# Patient Record
Sex: MALE | NOT HISPANIC OR LATINO | Employment: FULL TIME | URBAN - METROPOLITAN AREA
[De-identification: names, ages, dates, MRNs, and addresses within clinical notes are randomized per-mention and may not be internally consistent; named-entity substitution may affect disease eponyms.]

---

## 2019-08-13 ENCOUNTER — OFFICE VISIT (OUTPATIENT)
Dept: MEDICAL GROUP | Facility: PHYSICIAN GROUP | Age: 29
End: 2019-08-13
Payer: COMMERCIAL

## 2019-08-13 VITALS
BODY MASS INDEX: 17.64 KG/M2 | OXYGEN SATURATION: 99 % | HEIGHT: 71 IN | HEART RATE: 70 BPM | TEMPERATURE: 98.1 F | DIASTOLIC BLOOD PRESSURE: 56 MMHG | RESPIRATION RATE: 16 BRPM | SYSTOLIC BLOOD PRESSURE: 94 MMHG | WEIGHT: 126 LBS

## 2019-08-13 DIAGNOSIS — F17.210 CIGARETTE NICOTINE DEPENDENCE WITHOUT COMPLICATION: ICD-10-CM

## 2019-08-13 DIAGNOSIS — Z86.39 HISTORY OF HYPERLIPIDEMIA: ICD-10-CM

## 2019-08-13 DIAGNOSIS — Z00.00 ROUTINE ADULT HEALTH MAINTENANCE: ICD-10-CM

## 2019-08-13 PROBLEM — F17.211 CIGARETTE NICOTINE DEPENDENCE IN REMISSION: Status: ACTIVE | Noted: 2019-08-13

## 2019-08-13 PROCEDURE — 99385 PREV VISIT NEW AGE 18-39: CPT | Performed by: FAMILY MEDICINE

## 2019-08-13 SDOH — HEALTH STABILITY: MENTAL HEALTH: HOW MANY STANDARD DRINKS CONTAINING ALCOHOL DO YOU HAVE ON A TYPICAL DAY?: 1 OR 2

## 2019-08-13 SDOH — HEALTH STABILITY: MENTAL HEALTH: HOW OFTEN DO YOU HAVE A DRINK CONTAINING ALCOHOL?: MONTHLY OR LESS

## 2019-08-13 SDOH — HEALTH STABILITY: MENTAL HEALTH: HOW OFTEN DO YOU HAVE 6 OR MORE DRINKS ON ONE OCCASION?: NEVER

## 2019-08-13 ASSESSMENT — PATIENT HEALTH QUESTIONNAIRE - PHQ9: CLINICAL INTERPRETATION OF PHQ2 SCORE: 0

## 2019-08-13 NOTE — PROGRESS NOTES
"cc: History of high cholesterol    Subjective:     Devon Tena is a 29 y.o. male presenting to \[facility]\"" care.  He mentions that he was previously seen at [facility] clinics and has had his previous set of labs done through [facility].  Mentions that he believes he received the Tdap vaccine when his wife was pregnant with twins last year.    1. History of hyperlipidemia  He mentions that he had labs done last year and was told that his cholesterol levels were borderline high.  Was asked to start fish oil.  He had been taking that for some time but discontinued it approximately 8 months ago.  Mentions that at that time his twins were born and he is just been busy is a new father.  Denies any chest pain or headaches.    2. Cigarette nicotine dependence without complication  Chronic medical diagnosis.  Has been smoking approximately 3 cigarettes a day since the age of 14.  Denies any coughing, shortness of breath or wheezing.      Review of systems:  See above and negative for fever chills, abdominal pain, dysuria, visual complaints, hearing difficulties, changes in bowel movements.  No Known Allergies    No current outpatient medications on file.    Allergies, past medical history, past surgical history, family history, social history reviewed and updated    Objective:     Vitals: BP (!) 94/56 (BP Location: Left arm, Patient Position: Sitting, BP Cuff Size: Small adult)   Pulse 70   Temp 36.7 °C (98.1 °F) (Temporal)   Resp 16   Ht 1.803 m (5' 11\")   Wt 57.2 kg (126 lb)   SpO2 99%   BMI 17.57 kg/m²   General:  Alert, pleasant, NAD  Eyes:  normal inspection of conjunctivae and lids, EOMI,   ENMT:  External ears and nose are normal.    Neck  supple,   Heart:  Regular rate and rhythm,  No LE edema  Respiratory:  Normal respiratory effort, Clear to auscultation bilaterally.  Abdomen:   soft, Non-distended,   Skin:  Warm, dry, no rashes,   Musculoskeletal:  Normal gait, Normal digits and nails.  Neurological: No tremors, "   Psych:   Affect/mood is normal, judgement is good, memory is intact, grooming is appropriate.    Assessment/Plan:     Devon was seen today for establish care.    Diagnoses and all orders for this visit:    History of hyperlipidemia  History of.  Will check labs.    Cigarette nicotine dependence without complication  Chronic diagnosis.  Smoking cessation discussed with patient.    Routine adult health maintenance  -     CBC WITH DIFFERENTIAL; Future  -     Comp Metabolic Panel; Future  -     Lipid Profile; Future          Return in about 1 year (around 8/13/2020).

## 2019-08-13 NOTE — LETTER
[facility]  Merna Mccarthy M.D.  [address]  [place] [address]  Fax: [phone]   Authorization for Release/Disclosure of   Protected Health Information   Name: DEVON BASS : 1990 SSN: [id number]   Address: [facility] [address] [place] [address] Phone:    [phone] (home)    I authorize the entity listed below to release/disclose the PHI below to:   [facility]/Merna Mccarthy M.D. and Merna Mccarthy M.D.   Provider or Entity Name:  [facility]   Address   City, State, Zip   Phone:      Fax:     Reason for request: continuity of care   Information to be released:    [  ] LAST COLONOSCOPY,  including any PATH REPORT and follow-up  [  ] LAST FIT/COLOGUARD RESULT [  ] LAST DEXA  [  ] LAST MAMMOGRAM  [  ] LAST PAP  [  y] LAST LABS [  ] RETINA EXAM REPORT  [  ] IMMUNIZATION RECORDS  [  ] Release all info      [  ] Check here and initial the line next to each item to release ALL health information INCLUDING  _____ Care and treatment for drug and / or alcohol abuse  _____ HIV testing, infection status, or AIDS  _____ Genetic Testing    DATES OF SERVICE OR TIME PERIOD TO BE DISCLOSED: _____________  I understand and acknowledge that:  * This Authorization may be revoked at any time by you in writing, except if your health information has already been used or disclosed.  * Your health information that will be used or disclosed as a result of you signing this authorization could be re-disclosed by the recipient. If this occurs, your re-disclosed health information may no longer be protected by State or Federal laws.  * You may refuse to sign this Authorization. Your refusal will not affect your ability to obtain treatment.  * This Authorization becomes effective upon signing and will  on (date) __________.      If no date is indicated, this Authorization will  one (1) year from the signature date.    Name: Devon Bass    Signature:   Date:     2019       PLEASE FAX REQUESTED RECORDS BACK TO:  [phone]

## 2020-11-23 ENCOUNTER — OFFICE VISIT (OUTPATIENT)
Dept: MEDICAL GROUP | Facility: PHYSICIAN GROUP | Age: 30
End: 2020-11-23
Payer: COMMERCIAL

## 2020-11-23 VITALS
HEIGHT: 70 IN | WEIGHT: 131 LBS | HEART RATE: 81 BPM | RESPIRATION RATE: 14 BRPM | SYSTOLIC BLOOD PRESSURE: 100 MMHG | TEMPERATURE: 98.4 F | OXYGEN SATURATION: 98 % | DIASTOLIC BLOOD PRESSURE: 68 MMHG | BODY MASS INDEX: 18.75 KG/M2

## 2020-11-23 DIAGNOSIS — Z86.39 HISTORY OF HYPERLIPIDEMIA: ICD-10-CM

## 2020-11-23 DIAGNOSIS — Z00.00 ROUTINE ADULT HEALTH MAINTENANCE: ICD-10-CM

## 2020-11-23 DIAGNOSIS — F17.210 CIGARETTE NICOTINE DEPENDENCE WITHOUT COMPLICATION: ICD-10-CM

## 2020-11-23 DIAGNOSIS — Z23 NEED FOR VACCINATION: ICD-10-CM

## 2020-11-23 PROCEDURE — 99395 PREV VISIT EST AGE 18-39: CPT | Mod: 25 | Performed by: FAMILY MEDICINE

## 2020-11-23 PROCEDURE — 90732 PPSV23 VACC 2 YRS+ SUBQ/IM: CPT | Performed by: FAMILY MEDICINE

## 2020-11-23 PROCEDURE — 90471 IMMUNIZATION ADMIN: CPT | Performed by: FAMILY MEDICINE

## 2020-11-23 ASSESSMENT — PATIENT HEALTH QUESTIONNAIRE - PHQ9: CLINICAL INTERPRETATION OF PHQ2 SCORE: 0

## 2020-11-24 ENCOUNTER — HOSPITAL ENCOUNTER (OUTPATIENT)
Dept: LAB | Facility: MEDICAL CENTER | Age: 30
End: 2020-11-24
Attending: FAMILY MEDICINE
Payer: COMMERCIAL

## 2020-11-24 DIAGNOSIS — Z00.00 ROUTINE ADULT HEALTH MAINTENANCE: ICD-10-CM

## 2020-11-24 LAB
ALBUMIN SERPL BCP-MCNC: 4.8 G/DL (ref 3.2–4.9)
ALBUMIN/GLOB SERPL: 1.7 G/DL
ALP SERPL-CCNC: 71 U/L (ref 30–99)
ALT SERPL-CCNC: 14 U/L (ref 2–50)
ANION GAP SERPL CALC-SCNC: 7 MMOL/L (ref 7–16)
AST SERPL-CCNC: 17 U/L (ref 12–45)
BASOPHILS # BLD AUTO: 0.6 % (ref 0–1.8)
BASOPHILS # BLD: 0.04 K/UL (ref 0–0.12)
BILIRUB SERPL-MCNC: 0.6 MG/DL (ref 0.1–1.5)
BUN SERPL-MCNC: 6 MG/DL (ref 8–22)
CALCIUM SERPL-MCNC: 9.8 MG/DL (ref 8.5–10.5)
CHLORIDE SERPL-SCNC: 101 MMOL/L (ref 96–112)
CHOLEST SERPL-MCNC: 182 MG/DL (ref 100–199)
CO2 SERPL-SCNC: 29 MMOL/L (ref 20–33)
CREAT SERPL-MCNC: 0.97 MG/DL (ref 0.5–1.4)
EOSINOPHIL # BLD AUTO: 0.24 K/UL (ref 0–0.51)
EOSINOPHIL NFR BLD: 3.5 % (ref 0–6.9)
ERYTHROCYTE [DISTWIDTH] IN BLOOD BY AUTOMATED COUNT: 47 FL (ref 35.9–50)
FASTING STATUS PATIENT QL REPORTED: NORMAL
GLOBULIN SER CALC-MCNC: 2.8 G/DL (ref 1.9–3.5)
GLUCOSE SERPL-MCNC: 84 MG/DL (ref 65–99)
HCT VFR BLD AUTO: 45 % (ref 42–52)
HDLC SERPL-MCNC: 56 MG/DL
HGB BLD-MCNC: 15.1 G/DL (ref 14–18)
IMM GRANULOCYTES # BLD AUTO: 0.01 K/UL (ref 0–0.11)
IMM GRANULOCYTES NFR BLD AUTO: 0.1 % (ref 0–0.9)
LDLC SERPL CALC-MCNC: 110 MG/DL
LYMPHOCYTES # BLD AUTO: 1.96 K/UL (ref 1–4.8)
LYMPHOCYTES NFR BLD: 28.6 % (ref 22–41)
MCH RBC QN AUTO: 31.6 PG (ref 27–33)
MCHC RBC AUTO-ENTMCNC: 33.6 G/DL (ref 33.7–35.3)
MCV RBC AUTO: 94.1 FL (ref 81.4–97.8)
MONOCYTES # BLD AUTO: 0.63 K/UL (ref 0–0.85)
MONOCYTES NFR BLD AUTO: 9.2 % (ref 0–13.4)
NEUTROPHILS # BLD AUTO: 3.98 K/UL (ref 1.82–7.42)
NEUTROPHILS NFR BLD: 58 % (ref 44–72)
NRBC # BLD AUTO: 0 K/UL
NRBC BLD-RTO: 0 /100 WBC
PLATELET # BLD AUTO: 216 K/UL (ref 164–446)
PMV BLD AUTO: 11.3 FL (ref 9–12.9)
POTASSIUM SERPL-SCNC: 4 MMOL/L (ref 3.6–5.5)
PROT SERPL-MCNC: 7.6 G/DL (ref 6–8.2)
RBC # BLD AUTO: 4.78 M/UL (ref 4.7–6.1)
SODIUM SERPL-SCNC: 137 MMOL/L (ref 135–145)
TRIGL SERPL-MCNC: 81 MG/DL (ref 0–149)
WBC # BLD AUTO: 6.9 K/UL (ref 4.8–10.8)

## 2020-11-24 PROCEDURE — 80053 COMPREHEN METABOLIC PANEL: CPT

## 2020-11-24 PROCEDURE — 85025 COMPLETE CBC W/AUTO DIFF WBC: CPT

## 2020-11-24 PROCEDURE — 36415 COLL VENOUS BLD VENIPUNCTURE: CPT

## 2020-11-24 PROCEDURE — 80061 LIPID PANEL: CPT

## 2020-11-24 NOTE — PROGRESS NOTES
"CC: annual exam                                                                                                                                    Devon presents today for an annual exam    Vaccines discussed with patient and patient will receive pneumococcal vaccine in the office today, has already received his flu shot.     Routine adult health maintenance  Patient here for annual exam. Reports no new hospitalizations, surgeries, or medical problems since last visit.  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 He is smoking about 5 cigarettes daily, has increased since he has been working form home. He is not interested in quitting at this time. Denies fevers, chills, shortness of breath, headaches or chest pain. Not currently exercising.     History of hyperlipidemia   No new lab work since last visit. New lab work ordered today.     Patient Active Problem List    Diagnosis Date Noted   • History of hyperlipidemia 08/13/2019   • Cigarette nicotine dependence without complication 08/13/2019       No current outpatient medications on file.     No current facility-administered medications for this visit.          Allergies as of 11/23/2020   • (No Known Allergies)          /68 (BP Location: Left arm, Patient Position: Sitting, BP Cuff Size: Adult)   Pulse 81   Temp 36.9 °C (98.4 °F) (Temporal)   Resp 14   Ht 1.803 m (5' 11\")   Wt 59.4 kg (131 lb)   SpO2 98%   BMI 18.27 kg/m²     Physical Exam:  Gen:         Alert and oriented, No apparent distress.  Neck:        supple, No Lymphadenopathy  Lungs:     Clear to auscultation bilaterally  CV:          Regular rate and rhythm. no LE edema            Ext:          No clubbing, cyanosis      Assessment and Plan.   30 y.o. male with the following issues.    Devon was seen today for annual exam.    Diagnoses and all orders for this visit:    Routine adult health maintenance  Patient " Counseling:  --Discussed dental visits, states he is waiting to see the dentist since covid started   --Encouraged regular exercise.   --Discussed tobacco, alcohol, or other drug use; availability of treatment for abuse. Patient declined  --Injury prevention: Discussed safety belts, safety helmets, smoke detector, etc.    Cigarette nicotine dependence  Chronic. Not improving. Patient has been smoking more while working from home. Smoking cessation education addressed and patient declined.     History of hyperlipidemia  Chronic. Patient reported.  Lab work ordered to be completed for next visit    Need for vaccination  Pneumococcal vaccine given in office today    Follow up: 1 year

## 2022-12-06 ENCOUNTER — OFFICE VISIT (OUTPATIENT)
Dept: INTERNAL MEDICINE | Facility: OTHER | Age: 32
End: 2022-12-06
Payer: COMMERCIAL

## 2022-12-06 VITALS
DIASTOLIC BLOOD PRESSURE: 72 MMHG | OXYGEN SATURATION: 96 % | HEIGHT: 71 IN | SYSTOLIC BLOOD PRESSURE: 119 MMHG | WEIGHT: 138.2 LBS | TEMPERATURE: 98.7 F | HEART RATE: 80 BPM | BODY MASS INDEX: 19.35 KG/M2

## 2022-12-06 DIAGNOSIS — Z00.00 ENCOUNTER FOR ROUTINE ADULT HEALTH EXAMINATION WITHOUT ABNORMAL FINDINGS: ICD-10-CM

## 2022-12-06 DIAGNOSIS — Z71.6 ENCOUNTER FOR TOBACCO USE CESSATION COUNSELING: ICD-10-CM

## 2022-12-06 DIAGNOSIS — Z00.00 ROUTINE ADULT HEALTH MAINTENANCE: ICD-10-CM

## 2022-12-06 DIAGNOSIS — Z86.39 HISTORY OF HYPERLIPIDEMIA: ICD-10-CM

## 2022-12-06 PROCEDURE — 90471 IMMUNIZATION ADMIN: CPT | Performed by: STUDENT IN AN ORGANIZED HEALTH CARE EDUCATION/TRAINING PROGRAM

## 2022-12-06 PROCEDURE — 99203 OFFICE O/P NEW LOW 30 MIN: CPT | Mod: 25,GC | Performed by: STUDENT IN AN ORGANIZED HEALTH CARE EDUCATION/TRAINING PROGRAM

## 2022-12-06 PROCEDURE — 90686 IIV4 VACC NO PRSV 0.5 ML IM: CPT | Performed by: STUDENT IN AN ORGANIZED HEALTH CARE EDUCATION/TRAINING PROGRAM

## 2022-12-06 ASSESSMENT — ENCOUNTER SYMPTOMS
MYALGIAS: 0
COUGH: 0
NECK PAIN: 0
LOSS OF CONSCIOUSNESS: 0
CONSTIPATION: 0
BLURRED VISION: 0
ABDOMINAL PAIN: 0
HEMOPTYSIS: 0
NAUSEA: 0
SORE THROAT: 0
DIARRHEA: 0
HEADACHES: 0
SINUS PAIN: 0
SHORTNESS OF BREATH: 0
SPUTUM PRODUCTION: 0
CHILLS: 0
SEIZURES: 0
DEPRESSION: 0
HEARTBURN: 0
WEIGHT LOSS: 0
BACK PAIN: 0
ORTHOPNEA: 0
FEVER: 0
VOMITING: 0
DOUBLE VISION: 0
DIZZINESS: 0
PALPITATIONS: 0

## 2022-12-06 ASSESSMENT — PATIENT HEALTH QUESTIONNAIRE - PHQ9: CLINICAL INTERPRETATION OF PHQ2 SCORE: 0

## 2022-12-06 NOTE — ASSESSMENT & PLAN NOTE
-Most recent labs 11/2020: Cholesterol 182, TG 81, HDL 5,   -Discussed at length the benefits to optimizing diet and exercise regimen  -Ordered outpatient Lipid Profile, Hemoglobin A1c, and NMR Lipoprofile

## 2022-12-06 NOTE — PATIENT INSTRUCTIONS
-Please follow up with your outpatient lab work  -Please incorporate diet and exercise lifestyle modifications into your routine

## 2022-12-06 NOTE — PROGRESS NOTES
New Patient    Chief Complaint   Patient presents with    New Patient     Establish care, no acute issues.       HISTORY OF PRESENT ILLNESS:     Mr. Devon Tena is our 32 year old male patient with a past medical history of hyperlipidemia ( in 2020), and current cigarette smoker who presents to our outpatient clinic to establish care. He has no active chief complaints at this time.    He works as a , and lives at home with his wife and two 3 year old twin children. Not currently taking any medications consistently. Denies any allergies or past surgical history. Family history significant for type 2 diabetes mellitus on mother's side, and hypertension on father's side. Smokes 4-6 cigarettes per day; drinks alcohol sparingly socially; denies any illicit drug use. Covid vaccinated x2 without boosters. Diet is vegetarian, but high in greasy foods and carbohydrates. Does not exercise regularly.    Administering Influenza Vaccination at today's visit. Ordering outpatient labwork, as patient's most recent labs are from November 2020. Discussed at length the benefits to optimizing diet and exercise regimen. Patient wishes to schedule next appointment for next year, and discuss labs by phone if possible.    Patient Active Problem List    Diagnosis Date Noted    Encounter for tobacco use cessation counseling 12/06/2022    Encounter for routine adult health examination 12/06/2022    History of hyperlipidemia 08/13/2019    Cigarette nicotine dependence without complication 08/13/2019     Allergies: Patient has no known allergies.    No current outpatient medications on file.     No current facility-administered medications for this visit.       Social History     Tobacco Use    Smoking status: Every Day     Packs/day: 0.75     Years: 15.00     Pack years: 11.25     Types: Cigarettes    Smokeless tobacco: Never    Tobacco comments:     4-6 Cigarettes per day    Vaping Use    Vaping Use: Never used  "  Substance Use Topics    Alcohol use: Yes     Comment: 1-2 per month- occasionally    Drug use: Not Currently     Comment: none       Family History   Problem Relation Age of Onset    Diabetes Mother     Hypertension Mother     Hyperlipidemia Father     No Known Problems Brother        Review of Systems   Constitutional:  Negative for chills, fever, malaise/fatigue and weight loss.   HENT:  Negative for congestion, sinus pain and sore throat.    Eyes:  Negative for blurred vision and double vision.   Respiratory:  Negative for cough, hemoptysis, sputum production and shortness of breath.    Cardiovascular:  Negative for chest pain, palpitations and orthopnea.   Gastrointestinal:  Negative for abdominal pain, constipation, diarrhea, heartburn, nausea and vomiting.   Genitourinary:  Negative for dysuria, frequency and urgency.   Musculoskeletal:  Negative for back pain, joint pain, myalgias and neck pain.   Skin:  Negative for itching and rash.   Neurological:  Negative for dizziness, seizures, loss of consciousness and headaches.   Psychiatric/Behavioral:  Negative for depression.      Exam:  /72 (BP Location: Left arm, Patient Position: Sitting, BP Cuff Size: Adult)   Pulse 80   Temp 37.1 °C (98.7 °F) (Temporal)   Ht 1.803 m (5' 11\")   Wt 62.7 kg (138 lb 3.2 oz)   SpO2 96%  Body mass index is 19.27 kg/m².    Constitutional:  Not in acute distress, well appearing.  HEENT:   Normocephalic, atraumatic.  Cardiovascular: S1, S2; Regular rate and rhythm; No murmurs/rubs/gallops.  Lungs:   Clear to auscultation bilaterally; No wheezes/rhales/rhonchi; No respiratory distress.  Abdomen: Soft, nondistended, not tender to palpation; no guarding no rigidity; no masses.  Extremities:  No cyanosis/clubbing/edema; No obvious deformities.  Skin:  Warm and dry.  No visible rashes.  Neurologic: Alert Awake & Oriented x 3, CN II-XII grossly intact; strength and sensation grossly intact.  No focal deficits " noted.  Psychiatric:  Affect normal, mood normal, judgment normal.    Assessment/Plan:   Mr. Devon Tena is our 32 year old male patient with a past medical history of hyperlipidemia ( in 2020), and current cigarette smoker who presents to our outpatient clinic to establish care.     History of hyperlipidemia  -Most recent labs 11/2020: Cholesterol 182, TG 81, HDL 5,   -Discussed at length the benefits to optimizing diet and exercise regimen  -Ordered outpatient Lipid Profile, Hemoglobin A1c, and NMR Lipoprofile    Encounter for tobacco use cessation counseling  -Patient regularly smoking 4-6 cigarettes daily  -Discussed at length with patient the risks associated with cigarette smoking, especially in light of his family history on mother and father's side and his elevated LDL and more sedentary lifestyle  -Patient not interested in assistance for tobacco cessation at this particular time    Encounter for routine adult health examination  -Patient sent for CBC, CMP, Lipid Profile, Hemoglobin A1c, and NMR Lipoprofile    All imaging results and lab results and consult notes are reviewed at this visit.  Followup: Return in about 1 year (around 12/6/2023).    Clarence Mccray MD  PGY-2 Internal Medicine Resident

## 2022-12-06 NOTE — ASSESSMENT & PLAN NOTE
-Patient regularly smoking 4-6 cigarettes daily  -Discussed at length with patient the risks associated with cigarette smoking, especially in light of his family history on mother and father's side and his elevated LDL and more sedentary lifestyle  -Patient not interested in assistance for tobacco cessation at this particular time

## 2023-11-06 ENCOUNTER — HOSPITAL ENCOUNTER (OUTPATIENT)
Dept: LAB | Facility: MEDICAL CENTER | Age: 33
End: 2023-11-06
Attending: STUDENT IN AN ORGANIZED HEALTH CARE EDUCATION/TRAINING PROGRAM
Payer: COMMERCIAL

## 2023-11-06 DIAGNOSIS — Z00.00 ROUTINE ADULT HEALTH MAINTENANCE: ICD-10-CM

## 2023-11-06 LAB
ALBUMIN SERPL BCP-MCNC: 4.6 G/DL (ref 3.2–4.9)
ALBUMIN/GLOB SERPL: 1.8 G/DL
ALP SERPL-CCNC: 68 U/L (ref 30–99)
ALT SERPL-CCNC: 12 U/L (ref 2–50)
ANION GAP SERPL CALC-SCNC: 9 MMOL/L (ref 7–16)
AST SERPL-CCNC: 22 U/L (ref 12–45)
BASOPHILS # BLD AUTO: 0.4 % (ref 0–1.8)
BASOPHILS # BLD: 0.02 K/UL (ref 0–0.12)
BILIRUB SERPL-MCNC: 0.6 MG/DL (ref 0.1–1.5)
BUN SERPL-MCNC: 7 MG/DL (ref 8–22)
CALCIUM ALBUM COR SERPL-MCNC: 8.6 MG/DL (ref 8.5–10.5)
CALCIUM SERPL-MCNC: 9.1 MG/DL (ref 8.4–10.2)
CHLORIDE SERPL-SCNC: 103 MMOL/L (ref 96–112)
CHOLEST SERPL-MCNC: 161 MG/DL (ref 100–199)
CO2 SERPL-SCNC: 26 MMOL/L (ref 20–33)
CREAT SERPL-MCNC: 0.9 MG/DL (ref 0.5–1.4)
EOSINOPHIL # BLD AUTO: 0.16 K/UL (ref 0–0.51)
EOSINOPHIL NFR BLD: 3.5 % (ref 0–6.9)
ERYTHROCYTE [DISTWIDTH] IN BLOOD BY AUTOMATED COUNT: 46.5 FL (ref 35.9–50)
EST. AVERAGE GLUCOSE BLD GHB EST-MCNC: 114 MG/DL
FASTING STATUS PATIENT QL REPORTED: NORMAL
GFR SERPLBLD CREATININE-BSD FMLA CKD-EPI: 114 ML/MIN/1.73 M 2
GLOBULIN SER CALC-MCNC: 2.6 G/DL (ref 1.9–3.5)
GLUCOSE SERPL-MCNC: 91 MG/DL (ref 65–99)
HBA1C MFR BLD: 5.6 % (ref 4–5.6)
HCT VFR BLD AUTO: 44.7 % (ref 42–52)
HDLC SERPL-MCNC: 51 MG/DL
HGB BLD-MCNC: 15 G/DL (ref 14–18)
IMM GRANULOCYTES # BLD AUTO: 0.01 K/UL (ref 0–0.11)
IMM GRANULOCYTES NFR BLD AUTO: 0.2 % (ref 0–0.9)
LDLC SERPL CALC-MCNC: 92 MG/DL
LYMPHOCYTES # BLD AUTO: 1.92 K/UL (ref 1–4.8)
LYMPHOCYTES NFR BLD: 42.4 % (ref 22–41)
MCH RBC QN AUTO: 31.3 PG (ref 27–33)
MCHC RBC AUTO-ENTMCNC: 33.6 G/DL (ref 32.3–36.5)
MCV RBC AUTO: 93.3 FL (ref 81.4–97.8)
MONOCYTES # BLD AUTO: 0.44 K/UL (ref 0–0.85)
MONOCYTES NFR BLD AUTO: 9.7 % (ref 0–13.4)
NEUTROPHILS # BLD AUTO: 1.98 K/UL (ref 1.82–7.42)
NEUTROPHILS NFR BLD: 43.8 % (ref 44–72)
NRBC # BLD AUTO: 0 K/UL
NRBC BLD-RTO: 0 /100 WBC (ref 0–0.2)
PLATELET # BLD AUTO: 254 K/UL (ref 164–446)
PMV BLD AUTO: 10.4 FL (ref 9–12.9)
POTASSIUM SERPL-SCNC: 4.3 MMOL/L (ref 3.6–5.5)
PROT SERPL-MCNC: 7.2 G/DL (ref 6–8.2)
RBC # BLD AUTO: 4.79 M/UL (ref 4.7–6.1)
SODIUM SERPL-SCNC: 138 MMOL/L (ref 135–145)
TRIGL SERPL-MCNC: 91 MG/DL (ref 0–149)
WBC # BLD AUTO: 4.5 K/UL (ref 4.8–10.8)

## 2023-11-06 PROCEDURE — 80053 COMPREHEN METABOLIC PANEL: CPT

## 2023-11-06 PROCEDURE — 83036 HEMOGLOBIN GLYCOSYLATED A1C: CPT

## 2023-11-06 PROCEDURE — 83704 LIPOPROTEIN BLD QUAN PART: CPT

## 2023-11-06 PROCEDURE — 85025 COMPLETE CBC W/AUTO DIFF WBC: CPT

## 2023-11-06 PROCEDURE — 36415 COLL VENOUS BLD VENIPUNCTURE: CPT

## 2023-11-06 PROCEDURE — 80061 LIPID PANEL: CPT

## 2023-11-10 LAB
CHOLEST SERPL-MCNC: 173 MG/DL
HDL PARTICAL NO Q4363: 33.7 UMOL/L
HDL SIZE Q4361: 8.8 NM
HDLC SERPL-MCNC: 51 MG/DL (ref 40–59)
HLD.LARGE SERPL-SCNC: 5.1 UMOL/L
L VLDL PART NO Q4357: <1.5 NMOL/L
LDL SERPL QN: 21.3 NM
LDL SERPL-SCNC: 1015 NMOL/L
LDL SMALL SERPL-SCNC: 266 NMOL/L
LDLC SERPL CALC-MCNC: 103 MG/DL
PATHOLOGY STUDY: ABNORMAL
TRIGL SERPL-MCNC: 93 MG/DL (ref 30–149)
VLDL SIZE Q4362: 44 NM

## 2023-11-29 ENCOUNTER — OFFICE VISIT (OUTPATIENT)
Dept: INTERNAL MEDICINE | Facility: OTHER | Age: 33
End: 2023-11-29
Payer: COMMERCIAL

## 2023-11-29 VITALS
HEART RATE: 83 BPM | TEMPERATURE: 99 F | DIASTOLIC BLOOD PRESSURE: 71 MMHG | OXYGEN SATURATION: 99 % | HEIGHT: 70 IN | SYSTOLIC BLOOD PRESSURE: 114 MMHG | WEIGHT: 139.6 LBS | BODY MASS INDEX: 19.98 KG/M2

## 2023-11-29 DIAGNOSIS — Z11.3 SCREEN FOR SEXUALLY TRANSMITTED DISEASES: ICD-10-CM

## 2023-11-29 DIAGNOSIS — D70.9 NEUTROPENIA, UNSPECIFIED TYPE (HCC): ICD-10-CM

## 2023-11-29 DIAGNOSIS — Z23 NEEDS FLU SHOT: ICD-10-CM

## 2023-11-29 DIAGNOSIS — Z00.00 ENCOUNTER FOR ROUTINE ADULT HEALTH EXAMINATION WITHOUT ABNORMAL FINDINGS: ICD-10-CM

## 2023-11-29 DIAGNOSIS — Z71.6 ENCOUNTER FOR TOBACCO USE CESSATION COUNSELING: ICD-10-CM

## 2023-11-29 DIAGNOSIS — Z23 NEED FOR VACCINATION: ICD-10-CM

## 2023-11-29 DIAGNOSIS — Z86.39 HISTORY OF HYPERLIPIDEMIA: ICD-10-CM

## 2023-11-29 DIAGNOSIS — Z11.59 NEED FOR HEPATITIS C SCREENING TEST: ICD-10-CM

## 2023-11-29 PROBLEM — D72.819 LEUKOPENIA: Status: ACTIVE | Noted: 2023-11-29

## 2023-11-29 PROCEDURE — 90686 IIV4 VACC NO PRSV 0.5 ML IM: CPT

## 2023-11-29 PROCEDURE — 3074F SYST BP LT 130 MM HG: CPT | Mod: GC

## 2023-11-29 PROCEDURE — 99395 PREV VISIT EST AGE 18-39: CPT | Mod: 25,GE

## 2023-11-29 PROCEDURE — 90471 IMMUNIZATION ADMIN: CPT

## 2023-11-29 PROCEDURE — 3078F DIAST BP <80 MM HG: CPT

## 2023-11-29 ASSESSMENT — ENCOUNTER SYMPTOMS
PALPITATIONS: 0
CHILLS: 0
HEADACHES: 0
MYALGIAS: 0
ABDOMINAL PAIN: 0
COUGH: 0
SEIZURES: 0
DIZZINESS: 0
BLOOD IN STOOL: 0
NAUSEA: 0
LOSS OF CONSCIOUSNESS: 0
SHORTNESS OF BREATH: 0
FEVER: 0
VOMITING: 0
HEARTBURN: 0
HEMOPTYSIS: 0
FALLS: 0

## 2023-11-29 ASSESSMENT — PATIENT HEALTH QUESTIONNAIRE - PHQ9: CLINICAL INTERPRETATION OF PHQ2 SCORE: 0

## 2023-11-29 ASSESSMENT — FIBROSIS 4 INDEX: FIB4 SCORE: 0.88

## 2023-11-29 NOTE — ASSESSMENT & PLAN NOTE
Noted to have leukopenia 4.5 on recent CBC with very mild neutropenia and lymphocytosis.  At the time of his collection of his labs, had cold-like symptoms.  Likely in setting of recent viral illness.  No current active signs/symptoms of infection.    -Ordered repeat CBC to assess for resolution  -Reevaluate at his next visit

## 2023-11-29 NOTE — ASSESSMENT & PLAN NOTE
-Provided influenza vaccination this visit  -Ordered hepatitis C and HIV studies for routine screening  -Repeat A1c ordered for screening for diabetes (recent A1c 5.6%)  -Not interested in COVID vaccination at this time

## 2023-11-29 NOTE — ASSESSMENT & PLAN NOTE
History of hyperlipidemia, recent lipid panel demonstrating total cholesterol 173, triglycerides 93, HDL 51, .    -Counseled on lifestyle changes  -Repeat lipid profile for next visit

## 2023-11-29 NOTE — PROGRESS NOTES
Established Patient    Patient Care Team:  Clarence Mccray M.D. as PCP - General (Internal Medicine)    KAREEM Tena is a 33 y.o. male who presents today with the following Chief Complaint(s): Follow up for Diagnoses of Neutropenia, unspecified type (HCC), History of hyperlipidemia, Need for vaccination, Needs flu shot, Screen for sexually transmitted diseases, Need for hepatitis C screening test, Encounter for routine adult health examination without abnormal findings, and Encounter for tobacco use cessation counseling were pertinent to this visit.    HPI:  Patient here for routine follow-up visit, seen near the end of last year.  States he has been doing well without any current complaints at this time.  Around the time of his lab collection notes that he had cold-like symptoms early November, now resolved.  Otherwise has been following a vegetarian diet, taking walks regularly during lunch breaks.  Works in IT currently.  Has been smoking quarter pack a day for the past 19 years, drinks rum and whiskey daily typically 1 drink daily at most reaching 9 drinks in a week.  Not interested in tobacco cessation at this time.  Patient is requesting influenza vaccination as well.    Review of Systems   Constitutional:  Negative for chills and fever.   Respiratory:  Negative for cough, hemoptysis and shortness of breath.    Cardiovascular:  Negative for chest pain, palpitations and leg swelling.   Gastrointestinal:  Negative for abdominal pain, blood in stool, heartburn, melena, nausea and vomiting.   Genitourinary:  Negative for dysuria and hematuria.   Musculoskeletal:  Negative for falls, joint pain and myalgias.   Neurological:  Negative for dizziness, seizures, loss of consciousness and headaches.       No past medical history on file.  Social History     Tobacco Use    Smoking status: Every Day     Current packs/day: 0.75     Average packs/day: 0.8 packs/day for 15.0 years (11.3 ttl pk-yrs)      "Types: Cigarettes    Smokeless tobacco: Never    Tobacco comments:     4-6 Cigarettes per day    Vaping Use    Vaping Use: Never used   Substance Use Topics    Alcohol use: Yes     Comment: 1-2 per month- occasionally    Drug use: Not Currently     Comment: none     No current outpatient medications on file.     No current facility-administered medications for this visit.       /71 (BP Location: Left arm, Patient Position: Sitting, BP Cuff Size: Adult)   Pulse 83   Temp 37.2 °C (99 °F) (Temporal)   Ht 1.78 m (5' 10.08\")   Wt 63.3 kg (139 lb 9.6 oz)   SpO2 99%   BMI 19.99 kg/m²   Physical Exam  Constitutional:       General: He is not in acute distress.     Appearance: He is normal weight.   HENT:      Head: Normocephalic and atraumatic.      Nose: Nose normal.      Mouth/Throat:      Mouth: Mucous membranes are moist.   Eyes:      Conjunctiva/sclera: Conjunctivae normal.   Cardiovascular:      Rate and Rhythm: Normal rate and regular rhythm.   Pulmonary:      Breath sounds: Normal breath sounds. No wheezing, rhonchi or rales.   Abdominal:      General: Abdomen is flat.      Palpations: Abdomen is soft.      Tenderness: There is no abdominal tenderness. There is no guarding or rebound.   Musculoskeletal:      Right lower leg: No edema.      Left lower leg: No edema.   Skin:     General: Skin is warm.   Neurological:      General: No focal deficit present.      Mental Status: He is alert and oriented to person, place, and time.   Psychiatric:         Mood and Affect: Mood normal.         Behavior: Behavior normal.         Assessment and Plan:     Leukopenia  Noted to have leukopenia 4.5 on recent CBC with very mild neutropenia and lymphocytosis.  At the time of his collection of his labs, had cold-like symptoms.  Likely in setting of recent viral illness.  No current active signs/symptoms of infection.    -Ordered repeat CBC to assess for resolution  -Reevaluate at his next visit    History of " hyperlipidemia  History of hyperlipidemia, recent lipid panel demonstrating total cholesterol 173, triglycerides 93, HDL 51, .    -Counseled on lifestyle changes  -Repeat lipid profile for next visit    Encounter for tobacco use cessation counseling  Patient not interested in cessation at this time.  Currently smoking quarter pack per day for the past 19 years.    -Attempted counseling cessation, however patient not interested in quitting at this time  -Reattempt next visit    Encounter for routine adult health examination  -Provided influenza vaccination this visit  -Ordered hepatitis C and HIV studies for routine screening  -Repeat A1c ordered for screening for diabetes (recent A1c 5.6%)  -Not interested in COVID vaccination at this time      Orders Placed This Encounter    INFLUENZA VACCINE QUAD INJ (PF)    HIV AG/AB COMBO ASSAY SCREENING    HEP C VIRUS ANTIBODY    Lipid Profile    CBC WITH DIFFERENTIAL    HEMOGLOBIN A1C       Return in about 1 year (around 11/29/2024).    Pablo Stein D.O. PGY II  Internal Medicine  [facility]

## 2023-11-29 NOTE — ASSESSMENT & PLAN NOTE
Patient not interested in cessation at this time.  Currently smoking quarter pack per day for the past 19 years.    -Attempted counseling cessation, however patient not interested in quitting at this time  -Reattempt next visit

## 2024-12-02 ENCOUNTER — HOSPITAL ENCOUNTER (OUTPATIENT)
Dept: LAB | Facility: MEDICAL CENTER | Age: 34
End: 2024-12-02
Payer: COMMERCIAL

## 2024-12-02 ENCOUNTER — APPOINTMENT (OUTPATIENT)
Dept: INTERNAL MEDICINE | Facility: OTHER | Age: 34
End: 2024-12-02
Payer: COMMERCIAL

## 2024-12-02 VITALS
SYSTOLIC BLOOD PRESSURE: 115 MMHG | DIASTOLIC BLOOD PRESSURE: 73 MMHG | TEMPERATURE: 98.3 F | BODY MASS INDEX: 20.58 KG/M2 | OXYGEN SATURATION: 98 % | WEIGHT: 147 LBS | HEIGHT: 71 IN | HEART RATE: 82 BPM

## 2024-12-02 DIAGNOSIS — Z00.00 ENCOUNTER FOR ROUTINE ADULT HEALTH EXAMINATION WITHOUT ABNORMAL FINDINGS: ICD-10-CM

## 2024-12-02 DIAGNOSIS — F17.210 CIGARETTE NICOTINE DEPENDENCE WITHOUT COMPLICATION: ICD-10-CM

## 2024-12-02 DIAGNOSIS — D70.9 NEUTROPENIA, UNSPECIFIED TYPE (HCC): ICD-10-CM

## 2024-12-02 DIAGNOSIS — Z11.4 SCREENING FOR HIV (HUMAN IMMUNODEFICIENCY VIRUS): ICD-10-CM

## 2024-12-02 DIAGNOSIS — Z86.39 HISTORY OF HYPERLIPIDEMIA: ICD-10-CM

## 2024-12-02 DIAGNOSIS — Z23 NEED FOR VACCINATION: ICD-10-CM

## 2024-12-02 DIAGNOSIS — Z71.6 ENCOUNTER FOR TOBACCO USE CESSATION COUNSELING: ICD-10-CM

## 2024-12-02 DIAGNOSIS — Z13.228 SCREENING FOR METABOLIC DISORDER: ICD-10-CM

## 2024-12-02 DIAGNOSIS — Z11.59 NEED FOR HEPATITIS C SCREENING TEST: ICD-10-CM

## 2024-12-02 LAB
ALBUMIN SERPL BCP-MCNC: 4.5 G/DL (ref 3.2–4.9)
ALBUMIN/GLOB SERPL: 1.6 G/DL
ALP SERPL-CCNC: 77 U/L (ref 30–99)
ALT SERPL-CCNC: 11 U/L (ref 2–50)
ANION GAP SERPL CALC-SCNC: 8 MMOL/L (ref 7–16)
AST SERPL-CCNC: 13 U/L (ref 12–45)
BASOPHILS # BLD AUTO: 0.5 % (ref 0–1.8)
BASOPHILS # BLD: 0.04 K/UL (ref 0–0.12)
BILIRUB SERPL-MCNC: 0.5 MG/DL (ref 0.1–1.5)
BUN SERPL-MCNC: 9 MG/DL (ref 8–22)
CALCIUM ALBUM COR SERPL-MCNC: 9.2 MG/DL (ref 8.5–10.5)
CALCIUM SERPL-MCNC: 9.6 MG/DL (ref 8.4–10.2)
CHLORIDE SERPL-SCNC: 102 MMOL/L (ref 96–112)
CHOLEST SERPL-MCNC: 179 MG/DL (ref 100–199)
CO2 SERPL-SCNC: 28 MMOL/L (ref 20–33)
CREAT SERPL-MCNC: 0.91 MG/DL (ref 0.5–1.4)
EOSINOPHIL # BLD AUTO: 0.23 K/UL (ref 0–0.51)
EOSINOPHIL NFR BLD: 3.1 % (ref 0–6.9)
ERYTHROCYTE [DISTWIDTH] IN BLOOD BY AUTOMATED COUNT: 47 FL (ref 35.9–50)
EST. AVERAGE GLUCOSE BLD GHB EST-MCNC: 105 MG/DL
FASTING STATUS PATIENT QL REPORTED: NORMAL
GFR SERPLBLD CREATININE-BSD FMLA CKD-EPI: 112 ML/MIN/1.73 M 2
GLOBULIN SER CALC-MCNC: 2.8 G/DL (ref 1.9–3.5)
GLUCOSE SERPL-MCNC: 95 MG/DL (ref 65–99)
HBA1C MFR BLD: 5.3 % (ref 4–5.6)
HCT VFR BLD AUTO: 45.8 % (ref 42–52)
HCV AB SER QL: NORMAL
HDLC SERPL-MCNC: 64 MG/DL
HGB BLD-MCNC: 15.4 G/DL (ref 14–18)
HIV 1+2 AB+HIV1 P24 AG SERPL QL IA: NORMAL
IMM GRANULOCYTES # BLD AUTO: 0.02 K/UL (ref 0–0.11)
IMM GRANULOCYTES NFR BLD AUTO: 0.3 % (ref 0–0.9)
LDLC SERPL CALC-MCNC: 97 MG/DL
LYMPHOCYTES # BLD AUTO: 1.77 K/UL (ref 1–4.8)
LYMPHOCYTES NFR BLD: 24 % (ref 22–41)
MCH RBC QN AUTO: 32 PG (ref 27–33)
MCHC RBC AUTO-ENTMCNC: 33.6 G/DL (ref 32.3–36.5)
MCV RBC AUTO: 95.2 FL (ref 81.4–97.8)
MONOCYTES # BLD AUTO: 0.54 K/UL (ref 0–0.85)
MONOCYTES NFR BLD AUTO: 7.3 % (ref 0–13.4)
NEUTROPHILS # BLD AUTO: 4.79 K/UL (ref 1.82–7.42)
NEUTROPHILS NFR BLD: 64.8 % (ref 44–72)
NRBC # BLD AUTO: 0 K/UL
NRBC BLD-RTO: 0 /100 WBC (ref 0–0.2)
PLATELET # BLD AUTO: 217 K/UL (ref 164–446)
PMV BLD AUTO: 10.3 FL (ref 9–12.9)
POTASSIUM SERPL-SCNC: 4.7 MMOL/L (ref 3.6–5.5)
PROT SERPL-MCNC: 7.3 G/DL (ref 6–8.2)
RBC # BLD AUTO: 4.81 M/UL (ref 4.7–6.1)
SODIUM SERPL-SCNC: 138 MMOL/L (ref 135–145)
TRIGL SERPL-MCNC: 90 MG/DL (ref 0–149)
WBC # BLD AUTO: 7.4 K/UL (ref 4.8–10.8)

## 2024-12-02 PROCEDURE — 87389 HIV-1 AG W/HIV-1&-2 AB AG IA: CPT

## 2024-12-02 PROCEDURE — 80053 COMPREHEN METABOLIC PANEL: CPT

## 2024-12-02 PROCEDURE — 80061 LIPID PANEL: CPT

## 2024-12-02 PROCEDURE — 99395 PREV VISIT EST AGE 18-39: CPT | Mod: 25,GE

## 2024-12-02 PROCEDURE — 90656 IIV3 VACC NO PRSV 0.5 ML IM: CPT | Mod: GE

## 2024-12-02 PROCEDURE — 3074F SYST BP LT 130 MM HG: CPT | Mod: GC

## 2024-12-02 PROCEDURE — 85025 COMPLETE CBC W/AUTO DIFF WBC: CPT

## 2024-12-02 PROCEDURE — 86803 HEPATITIS C AB TEST: CPT

## 2024-12-02 PROCEDURE — 90471 IMMUNIZATION ADMIN: CPT | Mod: GE

## 2024-12-02 PROCEDURE — 3078F DIAST BP <80 MM HG: CPT | Mod: GC

## 2024-12-02 PROCEDURE — 36415 COLL VENOUS BLD VENIPUNCTURE: CPT

## 2024-12-02 PROCEDURE — 83036 HEMOGLOBIN GLYCOSYLATED A1C: CPT

## 2024-12-02 ASSESSMENT — ENCOUNTER SYMPTOMS
SHORTNESS OF BREATH: 0
COUGH: 0
BLOOD IN STOOL: 0
SEIZURES: 0
PALPITATIONS: 0
HEARTBURN: 0
DIZZINESS: 0
FEVER: 0
ABDOMINAL PAIN: 0
NERVOUS/ANXIOUS: 0
WHEEZING: 0
CHILLS: 0
NAUSEA: 0
HEADACHES: 0
FALLS: 0
VOMITING: 0
LOSS OF CONSCIOUSNESS: 0
DEPRESSION: 0

## 2024-12-02 ASSESSMENT — PATIENT HEALTH QUESTIONNAIRE - PHQ9: CLINICAL INTERPRETATION OF PHQ2 SCORE: 0

## 2024-12-02 ASSESSMENT — FIBROSIS 4 INDEX: FIB4 SCORE: 0.9

## 2024-12-02 NOTE — ASSESSMENT & PLAN NOTE
Noted to have leukopenia 4.5 on prior CBC with very mild neutropenia and lymphocytosis.  No current active signs/symptoms of infection.    -Ordered repeat CBC to assess for resolution  -Reevaluate at his next visit

## 2024-12-02 NOTE — PROGRESS NOTES
Established Patient    Patient Care Team:  Pablo Stein D.O. as PCP - General (Internal Medicine)    KAREEM Tena is a 34 y.o. male who presents today with the following Chief Complaint(s): Follow up for Diagnoses of Encounter for routine adult health examination without abnormal findings, Neutropenia, unspecified type (HCC), History of hyperlipidemia, Cigarette nicotine dependence without complication, Need for vaccination, Need for hepatitis C screening test, Screening for HIV (human immunodeficiency virus), Screening for metabolic disorder, and Encounter for tobacco use cessation counseling were pertinent to this visit.    HPI:  Patient is a pleasant 34-year-old male with history of HLD, tobacco use, neutropenia presenting for routine follow-up visit.  Reporting no current complaints at this time.  However still smoking 6 to 7 cigarettes a day.  Has been drinking only about 1 drink every day/every other day.  Otherwise states he has been in good health and here for his annual preventative examination.  Has been trying to watch his diet.  Interested in influenza vaccination at this time.    Review of Systems   Constitutional:  Negative for chills and fever.   Respiratory:  Negative for cough, shortness of breath and wheezing.    Cardiovascular:  Negative for chest pain, palpitations and leg swelling.   Gastrointestinal:  Negative for abdominal pain, blood in stool, heartburn, melena, nausea and vomiting.   Genitourinary:  Negative for dysuria and hematuria.   Musculoskeletal:  Negative for falls.   Neurological:  Negative for dizziness, seizures, loss of consciousness and headaches.   Psychiatric/Behavioral:  Negative for depression and suicidal ideas. The patient is not nervous/anxious.        No past medical history on file.  Social History     Tobacco Use    Smoking status: Every Day     Current packs/day: 0.75     Average packs/day: 0.8 packs/day for 15.0 years (11.3 ttl pk-yrs)     Types:  "Cigarettes    Smokeless tobacco: Never    Tobacco comments:     4-6 Cigarettes per day    Vaping Use    Vaping status: Never Used   Substance Use Topics    Alcohol use: Yes     Comment: 1-2 per month- occasionally    Drug use: Not Currently     Comment: none     No current outpatient medications on file.     No current facility-administered medications for this visit.       /73 (BP Location: Left arm, Patient Position: Sitting, BP Cuff Size: Adult)   Pulse 82   Temp 36.8 °C (98.3 °F) (Temporal)   Ht 1.798 m (5' 10.8\")   Wt 66.7 kg (147 lb)   SpO2 98%   BMI 20.62 kg/m²   Physical Exam  Constitutional:       General: He is not in acute distress.  HENT:      Head: Normocephalic and atraumatic.      Nose: Nose normal.      Mouth/Throat:      Mouth: Mucous membranes are moist.   Eyes:      Conjunctiva/sclera: Conjunctivae normal.   Cardiovascular:      Rate and Rhythm: Normal rate and regular rhythm.      Heart sounds: No murmur heard.     No friction rub. No gallop.   Pulmonary:      Breath sounds: Normal breath sounds. No wheezing, rhonchi or rales.   Abdominal:      General: Abdomen is flat. Bowel sounds are normal.      Palpations: Abdomen is soft.      Tenderness: There is no abdominal tenderness. There is no guarding or rebound.   Musculoskeletal:      Right lower leg: No edema.      Left lower leg: No edema.   Neurological:      General: No focal deficit present.      Mental Status: He is alert and oriented to person, place, and time.   Psychiatric:         Mood and Affect: Mood normal.         Behavior: Behavior normal.         Assessment and Plan:     Encounter for routine adult health examination  Here for annual preventive examination, no current complaints at this time with physical exam unremarkable.  -Provided influenza vaccination this visit  -Ordered hepatitis C and HIV studies for routine screening  -Not interested in COVID vaccination at this time     Leukopenia  Noted to have leukopenia 4.5 " on prior CBC with very mild neutropenia and lymphocytosis.  No current active signs/symptoms of infection.    -Ordered repeat CBC to assess for resolution  -Reevaluate at his next visit    History of hyperlipidemia  History of hyperlipidemia based off prior lipid panel which did normalize    -Counseled on lifestyle changes  -Repeat lipid profile for next visit    Encounter for tobacco use cessation counseling  Patient not interested in cessation at this time.  Currently smoking quarter pack per day for the past 20 years.    -Attempted counseling cessation, however patient not interested in quitting at this time  -Reattempt next visit      Orders Placed This Encounter    INFLUENZA VACCINE TRI INJ (PF)    CBC WITH DIFFERENTIAL    HIV AG/AB COMBO ASSAY SCREENING    HEP C VIRUS ANTIBODY    Lipid Profile    Comp Metabolic Panel       Return in about 6 months (around 6/2/2025).    This note was created using voice recognition software. While every attempt is made to ensure accuracy of transcription, occasionally errors occur.     Pablo Stein D.O. PGY III  Internal Medicine  [facility]

## 2024-12-02 NOTE — ASSESSMENT & PLAN NOTE
Patient not interested in cessation at this time.  Currently smoking quarter pack per day for the past 20 years.    -Attempted counseling cessation, however patient not interested in quitting at this time  -Reattempt next visit

## 2024-12-02 NOTE — ASSESSMENT & PLAN NOTE
Here for annual preventive examination, no current complaints at this time with physical exam unremarkable.  -Provided influenza vaccination this visit  -Ordered hepatitis C and HIV studies for routine screening  -Not interested in COVID vaccination at this time

## 2024-12-02 NOTE — ASSESSMENT & PLAN NOTE
History of hyperlipidemia based off prior lipid panel which did normalize    -Counseled on lifestyle changes  -Repeat lipid profile for next visit

## 2025-05-27 ENCOUNTER — HOSPITAL ENCOUNTER (OUTPATIENT)
Facility: MEDICAL CENTER | Age: 35
End: 2025-05-27
Payer: COMMERCIAL

## 2025-05-27 ENCOUNTER — OFFICE VISIT (OUTPATIENT)
Dept: INTERNAL MEDICINE | Facility: OTHER | Age: 35
End: 2025-05-27
Payer: COMMERCIAL

## 2025-05-27 VITALS
HEIGHT: 70 IN | TEMPERATURE: 99.2 F | SYSTOLIC BLOOD PRESSURE: 107 MMHG | DIASTOLIC BLOOD PRESSURE: 72 MMHG | OXYGEN SATURATION: 96 % | BODY MASS INDEX: 20.62 KG/M2 | WEIGHT: 144 LBS | HEART RATE: 72 BPM

## 2025-05-27 DIAGNOSIS — Z00.00 ENCOUNTER FOR ROUTINE ADULT HEALTH EXAMINATION WITHOUT ABNORMAL FINDINGS: ICD-10-CM

## 2025-05-27 DIAGNOSIS — Z86.39 HISTORY OF HYPERLIPIDEMIA: ICD-10-CM

## 2025-05-27 DIAGNOSIS — D70.9 NEUTROPENIA, UNSPECIFIED TYPE (HCC): ICD-10-CM

## 2025-05-27 DIAGNOSIS — Z13.228 SCREENING FOR METABOLIC DISORDER: ICD-10-CM

## 2025-05-27 DIAGNOSIS — F17.210 CIGARETTE NICOTINE DEPENDENCE WITHOUT COMPLICATION: Primary | ICD-10-CM

## 2025-05-27 DIAGNOSIS — Z00.00 HEALTHCARE MAINTENANCE: ICD-10-CM

## 2025-05-27 DIAGNOSIS — Z11.59 NEED FOR HEPATITIS C SCREENING TEST: ICD-10-CM

## 2025-05-27 DIAGNOSIS — Z11.4 SCREENING FOR HIV (HUMAN IMMUNODEFICIENCY VIRUS): ICD-10-CM

## 2025-05-27 PROBLEM — Z71.6 ENCOUNTER FOR TOBACCO USE CESSATION COUNSELING: Status: RESOLVED | Noted: 2022-12-06 | Resolved: 2025-05-27

## 2025-05-27 PROBLEM — D72.819 LEUKOPENIA: Status: RESOLVED | Noted: 2023-11-29 | Resolved: 2025-05-27

## 2025-05-27 LAB
ALBUMIN SERPL BCP-MCNC: 4.6 G/DL (ref 3.2–4.9)
ALBUMIN/GLOB SERPL: 1.7 G/DL
ALP SERPL-CCNC: 66 U/L (ref 30–99)
ALT SERPL-CCNC: 11 U/L (ref 2–50)
ANION GAP SERPL CALC-SCNC: 12 MMOL/L (ref 7–16)
AST SERPL-CCNC: 15 U/L (ref 12–45)
BASOPHILS # BLD AUTO: 0.5 % (ref 0–1.8)
BASOPHILS # BLD: 0.03 K/UL (ref 0–0.12)
BILIRUB SERPL-MCNC: 0.5 MG/DL (ref 0.1–1.5)
BUN SERPL-MCNC: 7 MG/DL (ref 8–22)
CALCIUM ALBUM COR SERPL-MCNC: 9 MG/DL (ref 8.5–10.5)
CALCIUM SERPL-MCNC: 9.5 MG/DL (ref 8.5–10.5)
CHLORIDE SERPL-SCNC: 102 MMOL/L (ref 96–112)
CHOLEST SERPL-MCNC: 186 MG/DL (ref 100–199)
CO2 SERPL-SCNC: 25 MMOL/L (ref 20–33)
CREAT SERPL-MCNC: 0.98 MG/DL (ref 0.5–1.4)
EOSINOPHIL # BLD AUTO: 0.18 K/UL (ref 0–0.51)
EOSINOPHIL NFR BLD: 3.1 % (ref 0–6.9)
ERYTHROCYTE [DISTWIDTH] IN BLOOD BY AUTOMATED COUNT: 47.9 FL (ref 35.9–50)
FASTING STATUS PATIENT QL REPORTED: NORMAL
GFR SERPLBLD CREATININE-BSD FMLA CKD-EPI: 102 ML/MIN/1.73 M 2
GLOBULIN SER CALC-MCNC: 2.7 G/DL (ref 1.9–3.5)
GLUCOSE SERPL-MCNC: 87 MG/DL (ref 65–99)
HCT VFR BLD AUTO: 43.2 % (ref 42–52)
HCV AB SER QL: NORMAL
HDLC SERPL-MCNC: 46 MG/DL
HGB BLD-MCNC: 14.4 G/DL (ref 14–18)
HIV 1+2 AB+HIV1 P24 AG SERPL QL IA: NORMAL
IMM GRANULOCYTES # BLD AUTO: 0.02 K/UL (ref 0–0.11)
IMM GRANULOCYTES NFR BLD AUTO: 0.3 % (ref 0–0.9)
LDLC SERPL CALC-MCNC: 121 MG/DL
LYMPHOCYTES # BLD AUTO: 1.97 K/UL (ref 1–4.8)
LYMPHOCYTES NFR BLD: 33.7 % (ref 22–41)
MCH RBC QN AUTO: 31.8 PG (ref 27–33)
MCHC RBC AUTO-ENTMCNC: 33.3 G/DL (ref 32.3–36.5)
MCV RBC AUTO: 95.4 FL (ref 81.4–97.8)
MONOCYTES # BLD AUTO: 0.39 K/UL (ref 0–0.85)
MONOCYTES NFR BLD AUTO: 6.7 % (ref 0–13.4)
NEUTROPHILS # BLD AUTO: 3.26 K/UL (ref 1.82–7.42)
NEUTROPHILS NFR BLD: 55.7 % (ref 44–72)
NRBC # BLD AUTO: 0 K/UL
NRBC BLD-RTO: 0 /100 WBC (ref 0–0.2)
PLATELET # BLD AUTO: 266 K/UL (ref 164–446)
PMV BLD AUTO: 11.6 FL (ref 9–12.9)
POTASSIUM SERPL-SCNC: 3.9 MMOL/L (ref 3.6–5.5)
PROT SERPL-MCNC: 7.3 G/DL (ref 6–8.2)
RBC # BLD AUTO: 4.53 M/UL (ref 4.7–6.1)
SODIUM SERPL-SCNC: 139 MMOL/L (ref 135–145)
TRIGL SERPL-MCNC: 94 MG/DL (ref 0–149)
WBC # BLD AUTO: 5.9 K/UL (ref 4.8–10.8)

## 2025-05-27 PROCEDURE — 85025 COMPLETE CBC W/AUTO DIFF WBC: CPT

## 2025-05-27 PROCEDURE — 36415 COLL VENOUS BLD VENIPUNCTURE: CPT

## 2025-05-27 PROCEDURE — 87389 HIV-1 AG W/HIV-1&-2 AB AG IA: CPT

## 2025-05-27 PROCEDURE — 80061 LIPID PANEL: CPT

## 2025-05-27 PROCEDURE — 86803 HEPATITIS C AB TEST: CPT

## 2025-05-27 PROCEDURE — 80053 COMPREHEN METABOLIC PANEL: CPT

## 2025-05-27 ASSESSMENT — ENCOUNTER SYMPTOMS
SEIZURES: 0
FALLS: 0
NERVOUS/ANXIOUS: 0
DEPRESSION: 0
FEVER: 0
LOSS OF CONSCIOUSNESS: 0
HEADACHES: 0
PALPITATIONS: 0
CHILLS: 0

## 2025-05-27 ASSESSMENT — PATIENT HEALTH QUESTIONNAIRE - PHQ9: CLINICAL INTERPRETATION OF PHQ2 SCORE: 0

## 2025-05-27 ASSESSMENT — FIBROSIS 4 INDEX: FIB4 SCORE: 0.61

## 2025-05-27 NOTE — ASSESSMENT & PLAN NOTE
Currently smoking 4-6 cigarettes, has been smoking since age 12. Not interested in quitting at this time.    -Counseled extensively on cessation  -Not interested in NRT at this time

## 2025-05-27 NOTE — PROGRESS NOTES
"    Established Patient    Patient Care Team:  Pablo Stein D.O. as PCP - General (Internal Medicine)    KAREEM Tena is a 34 y.o. male who presents today with the following Chief Complaint(s): Follow up for The primary encounter diagnosis was Cigarette nicotine dependence without complication. Diagnoses of History of hyperlipidemia, Screening for metabolic disorder, and Healthcare maintenance were also pertinent to this visit.    HPI:  Patient is a pleasant 34-year-old male with history of HLD, tobacco use, neutropenia presenting for routine follow-up visit.  Reporting no significant complaints at this time other than a bit of thigh soreness from squatting.  Reviewed labs with patient extensive detail.  Not amenable to quitting smoking at this time, however amenable to lifestyle changes as his diet has been all over the place and is not very physically active.    Review of Systems   Constitutional:  Negative for chills and fever.   Cardiovascular:  Negative for chest pain, palpitations and leg swelling.   Genitourinary:  Negative for dysuria and hematuria.   Musculoskeletal:  Negative for falls.   Neurological:  Negative for seizures, loss of consciousness and headaches.   Psychiatric/Behavioral:  Negative for depression. The patient is not nervous/anxious.        Past Medical History[1]  Social History[2]  Current Medications[3]    /72 (BP Location: Left arm, Patient Position: Sitting, BP Cuff Size: Adult)   Pulse 72   Temp 37.3 °C (99.2 °F) (Temporal)   Ht 1.778 m (5' 10\")   Wt 65.3 kg (144 lb)   SpO2 96%   BMI 20.66 kg/m²   Physical Exam  Constitutional:       General: He is not in acute distress.     Appearance: He is normal weight.   HENT:      Nose: Nose normal.      Mouth/Throat:      Mouth: Mucous membranes are moist.   Eyes:      Conjunctiva/sclera: Conjunctivae normal.   Cardiovascular:      Rate and Rhythm: Normal rate and regular rhythm.      Pulses: Normal pulses.      Heart " sounds: Normal heart sounds. No murmur heard.     No friction rub. No gallop.   Pulmonary:      Effort: Pulmonary effort is normal.      Breath sounds: No wheezing, rhonchi or rales.   Abdominal:      General: Abdomen is flat.      Palpations: Abdomen is soft.      Tenderness: There is no abdominal tenderness. There is no guarding or rebound.   Musculoskeletal:         General: No deformity.      Right lower leg: No edema.      Left lower leg: No edema.   Skin:     General: Skin is warm.      Capillary Refill: Capillary refill takes less than 2 seconds.   Neurological:      General: No focal deficit present.      Mental Status: He is alert and oriented to person, place, and time.   Psychiatric:         Mood and Affect: Mood normal.         Behavior: Behavior normal.         Assessment and Plan:     Cigarette nicotine dependence without complication  Currently smoking 4-6 cigarettes, has been smoking since age 12. Not interested in quitting at this time.    -Counseled extensively on cessation  -Not interested in NRT at this time    History of hyperlipidemia  History of hyperlipidemia, based off recent lipid profile LDL of 121, otherwise unremarkable.    -Counseled on lifestyle changes  -Repeat lipid profile prior to next annual visit    Healthcare maintenance  - Ordered CMP to screen for metabolic derangements, advised to obtain prior to next annual visit      Orders Placed This Encounter    Lipid Profile    Comp Metabolic Panel       Return in about 1 year (around 5/27/2026) for For reestablishment of care with incoming intern.    Case discussed with Dr. Hooker    This note was created using voice recognition software. While every attempt is made to ensure accuracy of transcription, occasionally errors occur.     Pablo Stein D.O. PGY III  Internal Medicine  [facility]         [1]   Past Medical History:  Diagnosis Date    Leukopenia 11/29/2023   [2]   Social History  Tobacco  Use    Smoking status: Every Day     Current packs/day: 0.75     Average packs/day: 0.8 packs/day for 15.0 years (11.3 ttl pk-yrs)     Types: Cigarettes    Smokeless tobacco: Never    Tobacco comments:     4-6 Cigarettes per day    Vaping Use    Vaping status: Never Used   Substance Use Topics    Alcohol use: Yes     Comment: 1-2 per month- occasionally    Drug use: Not Currently     Comment: none   [3]   No current outpatient medications on file.     No current facility-administered medications for this visit.

## 2025-05-27 NOTE — ASSESSMENT & PLAN NOTE
History of hyperlipidemia, based off recent lipid profile LDL of 121, otherwise unremarkable.    -Counseled on lifestyle changes  -Repeat lipid profile prior to next annual visit